# Patient Record
Sex: FEMALE | Race: WHITE | Employment: FULL TIME | URBAN - METROPOLITAN AREA
[De-identification: names, ages, dates, MRNs, and addresses within clinical notes are randomized per-mention and may not be internally consistent; named-entity substitution may affect disease eponyms.]

---

## 2019-05-24 ENCOUNTER — HOSPITAL ENCOUNTER (EMERGENCY)
Age: 29
Discharge: HOME | End: 2019-05-24
Attending: EMERGENCY MEDICINE
Payer: COMMERCIAL

## 2019-05-24 ENCOUNTER — APPOINTMENT (OUTPATIENT)
Dept: GENERAL RADIOLOGY | Age: 29
End: 2019-05-24
Payer: COMMERCIAL

## 2019-05-24 VITALS
SYSTOLIC BLOOD PRESSURE: 118 MMHG | HEIGHT: 67 IN | TEMPERATURE: 97.6 F | BODY MASS INDEX: 43.95 KG/M2 | RESPIRATION RATE: 16 BRPM | HEART RATE: 101 BPM | DIASTOLIC BLOOD PRESSURE: 70 MMHG | OXYGEN SATURATION: 100 % | WEIGHT: 280 LBS

## 2019-05-24 DIAGNOSIS — S82.891A CLOSED FRACTURE OF RIGHT ANKLE, INITIAL ENCOUNTER: Primary | ICD-10-CM

## 2019-05-24 DIAGNOSIS — S82.831A CLOSED FRACTURE OF DISTAL END OF RIGHT FIBULA, UNSPECIFIED FRACTURE MORPHOLOGY, INITIAL ENCOUNTER: ICD-10-CM

## 2019-05-24 DIAGNOSIS — S99.911A INJURY OF RIGHT ANKLE, INITIAL ENCOUNTER: ICD-10-CM

## 2019-05-24 DIAGNOSIS — S82.51XA CLOSED DISPLACED FRACTURE OF MEDIAL MALLEOLUS OF RIGHT TIBIA, INITIAL ENCOUNTER: ICD-10-CM

## 2019-05-24 PROCEDURE — 96374 THER/PROPH/DIAG INJ IV PUSH: CPT

## 2019-05-24 PROCEDURE — 6360000002 HC RX W HCPCS: Performed by: EMERGENCY MEDICINE

## 2019-05-24 PROCEDURE — 99284 EMERGENCY DEPT VISIT MOD MDM: CPT

## 2019-05-24 PROCEDURE — 2500000003 HC RX 250 WO HCPCS: Performed by: EMERGENCY MEDICINE

## 2019-05-24 PROCEDURE — 73630 X-RAY EXAM OF FOOT: CPT

## 2019-05-24 PROCEDURE — 2580000003 HC RX 258: Performed by: EMERGENCY MEDICINE

## 2019-05-24 PROCEDURE — 73610 X-RAY EXAM OF ANKLE: CPT

## 2019-05-24 RX ORDER — OXYCODONE HYDROCHLORIDE AND ACETAMINOPHEN 5; 325 MG/1; MG/1
1 TABLET ORAL EVERY 4 HOURS PRN
Qty: 20 TABLET | Refills: 0 | Status: SHIPPED | OUTPATIENT
Start: 2019-05-24 | End: 2019-05-29

## 2019-05-24 RX ORDER — ETOMIDATE 2 MG/ML
12 INJECTION INTRAVENOUS ONCE
Status: COMPLETED | OUTPATIENT
Start: 2019-05-24 | End: 2019-05-24

## 2019-05-24 RX ORDER — IBUPROFEN 800 MG/1
800 TABLET ORAL EVERY 8 HOURS PRN
Qty: 120 TABLET | Refills: 0 | Status: ON HOLD | OUTPATIENT
Start: 2019-05-24 | End: 2019-06-04 | Stop reason: HOSPADM

## 2019-05-24 RX ORDER — FENTANYL CITRATE 50 UG/ML
50 INJECTION, SOLUTION INTRAMUSCULAR; INTRAVENOUS ONCE
Status: COMPLETED | OUTPATIENT
Start: 2019-05-24 | End: 2019-05-24

## 2019-05-24 RX ORDER — 0.9 % SODIUM CHLORIDE 0.9 %
1000 INTRAVENOUS SOLUTION INTRAVENOUS ONCE
Status: COMPLETED | OUTPATIENT
Start: 2019-05-24 | End: 2019-05-24

## 2019-05-24 RX ADMIN — SODIUM CHLORIDE 1000 ML: 9 INJECTION, SOLUTION INTRAVENOUS at 14:05

## 2019-05-24 RX ADMIN — FENTANYL CITRATE 50 MCG: 50 INJECTION, SOLUTION INTRAMUSCULAR; INTRAVENOUS at 14:15

## 2019-05-24 RX ADMIN — ETOMIDATE 12 MG: 2 INJECTION INTRAVENOUS at 14:35

## 2019-05-24 ASSESSMENT — PAIN DESCRIPTION - LOCATION: LOCATION: ANKLE

## 2019-05-24 ASSESSMENT — PAIN DESCRIPTION - ORIENTATION: ORIENTATION: RIGHT

## 2019-05-24 ASSESSMENT — PAIN SCALES - GENERAL
PAINLEVEL_OUTOF10: 2
PAINLEVEL_OUTOF10: 4

## 2019-05-24 NOTE — ED NOTES
No acute distress noted on discharge.  Alert and oriented times [address], RN  05/24/19 7814

## 2019-05-24 NOTE — ED NOTES
PATIENT IS AWAKE, ALERT AND ORIENTED TIMES THREE, PLANNED DISCHARGE PER FACILITY PROTOCOL     Jeovanny Carson RN  05/24/19 8952

## 2019-05-24 NOTE — ED PROVIDER NOTES
Deformity: Yes.             ROM: Not examined due to significant swelling and deformity on exam              Skin:  No erythema, rash or open wounds noted. Compartments soft and compressible, No calf tenderness        Distal Function:              Motor deficit: none. Full ROM of all toes             Sensory deficit: none. Intact sensations distally              Pulse deficit: none. Strong pedal pulse             Capillary refill: normal. <3 seconds   No tenderness to right knee  Integument:  Normal turgor. Warm, dry, without visible rash, unless noted elsewhere. Neurological: Motor functions intact. Lab / Imaging Results   (All laboratory and radiology results have been personally reviewed by myself)  Labs:  No results found for this visit on 05/24/19. Imaging: All Radiology results interpreted by Radiologist unless otherwise noted. XR ANKLE RIGHT (MIN 3 VIEWS)   Final Result   1. No CT evidence of acute intracranial abnormality, as questioned. If   there is clinical concern for potential underlying acute   cerebrovascular infarction/accident or other potential abnormalities   of underlying brain parenchyma, MRI of the brain would be recommended   for more detailed evaluation. .         XR FOOT RIGHT (MIN 3 VIEWS)   Final Result   1. Fracture of the distal fibula. 2. Fracture of the medial malleolus/tibia. 3. Disruption of the Ankle mortise alignment. 4. 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 5. Spurring at the origin of plantar aponeurosis.          XR ANKLE RIGHT (MIN 3 VIEWS)    (Results Pending)     ED Course / Medical Decision Making     Medications   0.9 % sodium chloride bolus (1,000 mLs Intravenous New Bag 5/24/19 1405)   fentaNYL (SUBLIMAZE) injection 50 mcg (50 mcg Intravenous Given 5/24/19 1415)   etomidate (AMIDATE) injection 12 mg (12 mg Intravenous Given 5/24/19 1435)      Consult(s):  None    Procedure(s):  -------------------------------- Conscious Sedation Procedure Note -----------------------------  Patient Name: Delia Mena   Medical Record Number: [id number]  Date: 5/24/19   Time: 1:57 PM   Room/Bed: 02/02   Preformed by Dr. Manasa Phillips     Indication: ankle dislocation and fracture dislocation  Consent: I have discussed with the patient and/or the patient representative the indication, alternatives, and the possible risks and/or complications of the planned procedure and the anesthesia methods. The patient and/or patient representative appear to understand and agree to proceed. Physician Involvement: The attending physician was present and supervising this procedure. 5/24/19     Time: 1435 (pre-procedure start time)  Pre-Sedation Documentation and Exam: I have personally completed a history, physical exam & review of systems for this patient (see notes). Airway Assessment: normal  Prior History of Anesthesia Complications: none  ASA Classification: Class 1 - A normal healthy patient  Sedation/ Anesthesia Plan: intravenous sedation  Medications Used: etomidate intravenously  Monitoring and Safety: The patient was placed on a cardiac monitor and vital signs, pulse oximetry and level of consciousness were continuously evaluated throughout the procedure. The patient was closely monitored until recovery from the medications was complete and the patient had returned to baseline status.  Respiratory therapy was on standby at all times during the procedure.    ----------------------------------- Post Conscious Sedation Note -----------------------------------  (The following Post Sedation note must be completed)  5/24/19     Time: 1441 (as per nursing note stop time)  Post-Sedation Vital Signs: Vital signs were reviewed and were stable after the procedure (see flow sheet for vitals)       Post-Sedation Exam: Lungs: clear to auscultation bilaterally without crackles or wheezing and Cardiovascular: regular rate and rhythm       Complications: none       PROCEDURE NOTE    SPLINT home  Patient condition is good    New Medications     New Prescriptions    IBUPROFEN (IBU) 800 MG TABLET    Take 1 tablet by mouth every 8 hours as needed for Pain    OXYCODONE-ACETAMINOPHEN (PERCOCET) 5-325 MG PER TABLET    Take 1 tablet by mouth every 4 hours as needed for Pain for up to 5 days. Electronically signed by Katerina England PA-C   DD: 5/24/19  **This report was transcribed using voice recognition software. Every effort was made to ensure accuracy; however, inadvertent computerized transcription errors may be present.     END OF ED PROVIDER NOTE        Katerina England PA-C  05/24/19 6694

## 2019-05-24 NOTE — ED NOTES
Awake and conversing with family appropriately. Pain is a 3/10 and tolerable. Family remains at the bedside. Will monitor.       Tierra Penn RN  05/24/19 2011

## 2019-05-29 ENCOUNTER — OFFICE VISIT (OUTPATIENT)
Dept: ORTHOPEDIC SURGERY | Age: 29
End: 2019-05-29
Payer: COMMERCIAL

## 2019-05-29 VITALS
HEIGHT: 68 IN | WEIGHT: 285 LBS | HEART RATE: 114 BPM | SYSTOLIC BLOOD PRESSURE: 142 MMHG | BODY MASS INDEX: 43.19 KG/M2 | DIASTOLIC BLOOD PRESSURE: 107 MMHG

## 2019-05-29 DIAGNOSIS — S99.911A INJURY OF RIGHT ANKLE, INITIAL ENCOUNTER: Primary | ICD-10-CM

## 2019-05-29 PROCEDURE — 99203 OFFICE O/P NEW LOW 30 MIN: CPT | Performed by: ORTHOPAEDIC SURGERY

## 2019-05-29 NOTE — PROGRESS NOTES
New Ankle Patient     Referring Provider: No referring provider defined for this encounter. CHIEF COMPLAINT:   Chief Complaint   Patient presents with    ED Follow-up     5/24/19, WC right ankle, reduced in the ER     Ankle Injury     ( r ) ankle, states she was at work, in a trailer, the trailer had hay in it, states she stepped and slipped.  Ankle Pain     ( r ) ankle         HPI:    Dallas Carroll is a 33y.o. year old female who recently suffered a fall while at work and she twisted her right ankle awkwardly. She felt a sharp pain and was unable to bear weight. She went to the emergency room where x-ray showed a fracture dislocation of her ankle. This was reduced. She was placed in a splint. She follows up here today. She denies previous ankle pain or injuries    PAST MEDICAL HISTORY  No past medical history on file. PAST SURGICAL HISTORY  No past surgical history on file. FAMILY HISTORY   No family history on file.     SOCIAL HISTORY  Social History     Socioeconomic History    Marital status: Single     Spouse name: Not on file    Number of children: Not on file    Years of education: Not on file    Highest education level: Not on file   Occupational History    Not on file   Social Needs    Financial resource strain: Not on file    Food insecurity:     Worry: Not on file     Inability: Not on file    Transportation needs:     Medical: Not on file     Non-medical: Not on file   Tobacco Use    Smoking status: Former Smoker    Smokeless tobacco: Never Used   Substance and Sexual Activity    Alcohol use: Yes     Comment: socially    Drug use: Never    Sexual activity: Not on file   Lifestyle    Physical activity:     Days per week: Not on file     Minutes per session: Not on file    Stress: Not on file   Relationships    Social connections:     Talks on phone: Not on file     Gets together: Not on file     Attends Worship service: Not on file     Active member of club or organization: Not on file     Attends meetings of clubs or organizations: Not on file     Relationship status: Not on file    Intimate partner violence:     Fear of current or ex partner: Not on file     Emotionally abused: Not on file     Physically abused: Not on file     Forced sexual activity: Not on file   Other Topics Concern    Not on file   Social History Narrative    Not on file     Social History     Occupational History    Not on file   Tobacco Use    Smoking status: Former Smoker    Smokeless tobacco: Never Used   Substance and Sexual Activity    Alcohol use: Yes     Comment: socially    Drug use: Never    Sexual activity: Not on file       CURRENT MEDICATIONS     Current Outpatient Medications:     oxyCODONE-acetaminophen (PERCOCET) 5-325 MG per tablet, Take 1 tablet by mouth every 4 hours as needed for Pain for up to 5 days. , Disp: 20 tablet, Rfl: 0    ibuprofen (IBU) 800 MG tablet, Take 1 tablet by mouth every 8 hours as needed for Pain, Disp: 120 tablet, Rfl: 0    ALLERGIES  No Known Allergies    Controlled Substances Monitoring:        REVIEW OF SYSTEMS:     Constitutional:  Negative for weight loss, fevers, chills, fatigue  Cardiovascular: Negative for chest pain, palpitations  Pulmonary: Negative for shortness of breath, labored breathing, cough  GI: negative for abdominal pain, nausea, vomitting   MSK: per HPI  Skin: negative for rash, open wounds    All other systems reviewed and are negative       PHYSICAL EXAM     Vitals:    05/29/19 1131   BP: (!) 142/107   Site: Left Upper Arm   Position: Sitting   Cuff Size: Medium Adult   Pulse: 114   Weight: 285 lb (129.3 kg)   Height: 5' 8\" (1.727 m)       Height: 5' 8\" (1.727 m)  Weight: [unfilled]  BMI:  Body mass index is 43.33 kg/m². General: The patient is alert and oriented x 3, appears to be stated age and in no distress. HEENT: head is normocephalic, atraumatic. EOMI.    Neck: supple, trachea midline, no thyromegaly Cardiovascular: peripheral pulses palpable. Normal Capillary refill   Respiratory: breathing unlabored, chest expansion symmetric   Skin: no rash, no open wounds, no erythema  Psych: normal affect; mood stable  Neurologic: gait normal, sensation grossly intact in extremities  MSK:    Ankle: On exam of the ankle out of her splint, she does have some bruising and swelling both medial and lateral.  She has tenderness over the ankle. She is able to wiggle her toes. She has sensation throughout the foot. She has brisk cap refill in the toes     IMAGING:    XR: X-ray of the right ankle reviewed from the emergency room show fracture dislocation bimalleolar, with adequate reduction. Imaging discussed with patient    ASSESSMENT  Right ankle bimalleolar ankle fracture    PLAN  We discussed her ankle today. Given the nature of her injury it was recommended she undergo operative fixation. She was placed into a well-padded splint today to aid with reduction of swelling. We will also obtain a CT scan. We will plan on surgery likely next Tuesday pending skin condition. Risks were discussed in detail. She is electing to proceed.   She will remain nonweightbearing in the interim        Jhonathan Slade MD  Orthopaedic Surgery   5/29/19  4:24 PM

## 2019-05-31 ENCOUNTER — TELEPHONE (OUTPATIENT)
Dept: ORTHOPEDIC SURGERY | Age: 29
End: 2019-05-31

## 2019-05-31 NOTE — TELEPHONE ENCOUNTER
A C9 was sent to [facility], which is her private . Spoke to her  Simone Skelton who said he would get the surgery approved and send something to the office stating so.

## 2019-05-31 NOTE — PROGRESS NOTES
[facility] PRE-ADMISSION TESTING INSTRUCTIONS    The Preadmission Testing patient is instructed accordingly using the following criteria (check applicable):    ARRIVAL INSTRUCTIONS:  [x] Parking the day of Surgery is located in the Main Entrance lot. Upon entering the door, make an immediate right to the surgery reception desk    [x] [facility] is available Monday through Friday 6 am to 6 pm    [x] Bring photo ID and insurance card    [] Bring in a copy of Living will or Durable Power of  papers. [x] Please be sure to arrange for responsible adult to provide transportation to and from the hospital    [x] Please arrange for responsible adult to be with you for the 24 hour period post procedure due to having anesthesia      GENERAL INSTRUCTIONS:    [x] Nothing by mouth after midnight, including gum, candy, mints or water    [x] You may brush your teeth, but do not swallow any water    [] Take medications as instructed with 1-2 oz of water    [] Stop herbal supplements and vitamins 5 days prior to procedure    [] Follow preop dosing of blood thinners per physician instructions    [] Take 1/2 dose of evening insulin, but no insulin after midnight    [] No oral diabetic medications after midnight    [] If diabetic and have low blood sugar or feel symptomatic, take 1-2oz apple juice only    [] Bring inhalers day of surgery    [] Bring C-PAP/ Bi-Pap day of surgery    [x] Bring urine specimen day of surgery    [x] Shower or bath with soap, lather and rinse well, AM of Surgery, no lotion, powders or creams to surgical site    [] Follow bowel prep as instructed per surgeon    [x] No tobacco products within 24 hours of surgery     [x] No alcohol or illegal drug use within 24 hours of surgery.     [x] Jewelry, body piercing's, eyeglasses, contact lenses and dentures are not permitted into surgery (bring cases)      [x] Please do not wear any nail polish, make up or hair products on the day of surgery    [x] If not already done, you can expect a call from registration    [x] You can expect a call the business day prior to procedure to notify you if your arrival time changes    [x] If you receive a survey after surgery we would greatly appreciate your comments    [] Parent/guardian of a minor must accompany their child and remain on the premises  the entire time they are under our care     [] Pediatric patients may bring favorite toy, blanket or comfort item with them    [] A caregiver or family member must remain with the patient during their stay if they are mentally handicapped, have dementia, disoriented or unable to use a call light or would be a safety concern if left unattended    [x] Please notify surgeon if you develop any illness between now and time of surgery (cold, cough, sore throat, fever, nausea, vomiting) or any signs of infections  including skin, wounds, and dental.    [x]  The Outpatient Pharmacy is available to fill your prescription here on your day of surgery, ask your preop nurse for details    [] Other instructions  EDUCATIONAL MATERIALS PROVIDED:    [] PAT Preoperative Education Packet/Booklet     [] Medication List    [] Fluoroscopy Information Pamphlet    [] Transfusion bracelet applied with instructions    [] Joint replacement video reviewed    [] Shower with soap, lather and rinse well, and use CHG wipes provided the evening before surgery as instructed

## 2019-05-31 NOTE — TELEPHONE ENCOUNTER
Spoke to [place] at surgery scheduling 5/30 to get patient on the schedule for 6/4 for right ankle ORIF. Pt aware of all dates/times pre/post op.

## 2019-06-04 ENCOUNTER — HOSPITAL ENCOUNTER (OUTPATIENT)
Age: 29
Setting detail: OUTPATIENT SURGERY
Discharge: HOME | End: 2019-06-04
Attending: ORTHOPAEDIC SURGERY | Admitting: ORTHOPAEDIC SURGERY
Payer: COMMERCIAL

## 2019-06-04 ENCOUNTER — ANESTHESIA (OUTPATIENT)
Dept: OPERATING ROOM | Age: 29
End: 2019-06-04
Payer: COMMERCIAL

## 2019-06-04 ENCOUNTER — HOSPITAL ENCOUNTER (OUTPATIENT)
Dept: GENERAL RADIOLOGY | Age: 29
Discharge: HOME | End: 2019-06-06
Attending: ORTHOPAEDIC SURGERY
Payer: COMMERCIAL

## 2019-06-04 ENCOUNTER — ANESTHESIA EVENT (OUTPATIENT)
Dept: OPERATING ROOM | Age: 29
End: 2019-06-04
Payer: COMMERCIAL

## 2019-06-04 VITALS
OXYGEN SATURATION: 99 % | BODY MASS INDEX: 43.95 KG/M2 | HEART RATE: 69 BPM | TEMPERATURE: 97.1 F | WEIGHT: 280 LBS | RESPIRATION RATE: 20 BRPM | SYSTOLIC BLOOD PRESSURE: 134 MMHG | HEIGHT: 67 IN | DIASTOLIC BLOOD PRESSURE: 81 MMHG

## 2019-06-04 VITALS — OXYGEN SATURATION: 92 % | DIASTOLIC BLOOD PRESSURE: 62 MMHG | TEMPERATURE: 96.1 F | SYSTOLIC BLOOD PRESSURE: 118 MMHG

## 2019-06-04 DIAGNOSIS — S82.841A CLOSED BIMALLEOLAR FRACTURE OF RIGHT ANKLE, INITIAL ENCOUNTER: Primary | ICD-10-CM

## 2019-06-04 DIAGNOSIS — R52 PAIN: ICD-10-CM

## 2019-06-04 LAB — HCG(URINE) PREGNANCY TEST: NEGATIVE

## 2019-06-04 PROCEDURE — C1713 ANCHOR/SCREW BN/BN,TIS/BN: HCPCS | Performed by: ORTHOPAEDIC SURGERY

## 2019-06-04 PROCEDURE — 27814 TREATMENT OF ANKLE FRACTURE: CPT | Performed by: ORTHOPAEDIC SURGERY

## 2019-06-04 PROCEDURE — 3700000000 HC ANESTHESIA ATTENDED CARE: Performed by: ORTHOPAEDIC SURGERY

## 2019-06-04 PROCEDURE — 6360000002 HC RX W HCPCS: Performed by: ANESTHESIOLOGY

## 2019-06-04 PROCEDURE — 7100000001 HC PACU RECOVERY - ADDTL 15 MIN: Performed by: ORTHOPAEDIC SURGERY

## 2019-06-04 PROCEDURE — 2709999900 HC NON-CHARGEABLE SUPPLY: Performed by: ORTHOPAEDIC SURGERY

## 2019-06-04 PROCEDURE — 7100000011 HC PHASE II RECOVERY - ADDTL 15 MIN: Performed by: ORTHOPAEDIC SURGERY

## 2019-06-04 PROCEDURE — 3600000014 HC SURGERY LEVEL 4 ADDTL 15MIN: Performed by: ORTHOPAEDIC SURGERY

## 2019-06-04 PROCEDURE — 6360000002 HC RX W HCPCS

## 2019-06-04 PROCEDURE — 6360000002 HC RX W HCPCS: Performed by: ORTHOPAEDIC SURGERY

## 2019-06-04 PROCEDURE — 2580000003 HC RX 258: Performed by: ORTHOPAEDIC SURGERY

## 2019-06-04 PROCEDURE — 3700000001 HC ADD 15 MINUTES (ANESTHESIA): Performed by: ORTHOPAEDIC SURGERY

## 2019-06-04 PROCEDURE — 2500000003 HC RX 250 WO HCPCS: Performed by: ANESTHESIOLOGY

## 2019-06-04 PROCEDURE — 2500000003 HC RX 250 WO HCPCS

## 2019-06-04 PROCEDURE — 81025 URINE PREGNANCY TEST: CPT

## 2019-06-04 PROCEDURE — 7100000000 HC PACU RECOVERY - FIRST 15 MIN: Performed by: ORTHOPAEDIC SURGERY

## 2019-06-04 PROCEDURE — 3600000004 HC SURGERY LEVEL 4 BASE: Performed by: ORTHOPAEDIC SURGERY

## 2019-06-04 PROCEDURE — 64445 NJX AA&/STRD SCIATIC NRV IMG: CPT | Performed by: ANESTHESIOLOGY

## 2019-06-04 PROCEDURE — 7100000010 HC PHASE II RECOVERY - FIRST 15 MIN: Performed by: ORTHOPAEDIC SURGERY

## 2019-06-04 PROCEDURE — 6370000000 HC RX 637 (ALT 250 FOR IP): Performed by: ANESTHESIOLOGY

## 2019-06-04 PROCEDURE — 3209999900 FLUORO FOR SURGICAL PROCEDURES

## 2019-06-04 DEVICE — SCREW BNE L18MM DIA2.7MM ANK S STL LOK LO PROF FOR FRAC: Type: IMPLANTABLE DEVICE | Site: ANKLE | Status: FUNCTIONAL

## 2019-06-04 DEVICE — SCREW BNE L20MM DIA2.7MM FT ANK S STL LOK LO PROF: Type: IMPLANTABLE DEVICE | Site: ANKLE | Status: FUNCTIONAL

## 2019-06-04 DEVICE — SCREW BNE L14MM DIA3.5MM CORT ANK S STL NONLOCKING LO PROF: Type: IMPLANTABLE DEVICE | Site: ANKLE | Status: FUNCTIONAL

## 2019-06-04 DEVICE — SCREW BNE L32MM DIA3.5MM CORT ANK S STL NONLOCKING LO PROF: Type: IMPLANTABLE DEVICE | Site: ANKLE | Status: FUNCTIONAL

## 2019-06-04 DEVICE — SCREW BNE L26MM DIA3.5MM CORT ANK S STL NONLOCKING LO PROF: Type: IMPLANTABLE DEVICE | Site: ANKLE | Status: FUNCTIONAL

## 2019-06-04 DEVICE — PLATE BNE 3 H MED S STL LOK FOR ANK FRAC MGMT: Type: IMPLANTABLE DEVICE | Site: ANKLE | Status: FUNCTIONAL

## 2019-06-04 DEVICE — IMPLANTABLE DEVICE: Type: IMPLANTABLE DEVICE | Site: ANKLE | Status: FUNCTIONAL

## 2019-06-04 DEVICE — SCREW BNE L16MM DIA3.5MM CORT ANK S STL NONLOCKING LO PROF: Type: IMPLANTABLE DEVICE | Site: ANKLE | Status: FUNCTIONAL

## 2019-06-04 DEVICE — SCREW BNE L40MM DIA4MM CANC FT ANK S STL ST CANN NONLOCKING: Type: IMPLANTABLE DEVICE | Site: ANKLE | Status: FUNCTIONAL

## 2019-06-04 DEVICE — SCREW BNE L16MM DIA2.7MM ANK S STL LOK LO PROF FOR FRAC: Type: IMPLANTABLE DEVICE | Site: ANKLE | Status: FUNCTIONAL

## 2019-06-04 RX ORDER — SODIUM CHLORIDE 0.9 % (FLUSH) 0.9 %
10 SYRINGE (ML) INJECTION PRN
Status: DISCONTINUED | OUTPATIENT
Start: 2019-06-04 | End: 2019-06-04 | Stop reason: HOSPADM

## 2019-06-04 RX ORDER — MIDAZOLAM HYDROCHLORIDE 1 MG/ML
INJECTION INTRAMUSCULAR; INTRAVENOUS PRN
Status: DISCONTINUED | OUTPATIENT
Start: 2019-06-04 | End: 2019-06-04 | Stop reason: SDUPTHER

## 2019-06-04 RX ORDER — DEXAMETHASONE SODIUM PHOSPHATE 10 MG/ML
10 INJECTION, SOLUTION INTRAMUSCULAR; INTRAVENOUS ONCE
Status: COMPLETED | OUTPATIENT
Start: 2019-06-04 | End: 2019-06-04

## 2019-06-04 RX ORDER — LIDOCAINE HYDROCHLORIDE 10 MG/ML
10 INJECTION, SOLUTION INFILTRATION; PERINEURAL
Status: COMPLETED | OUTPATIENT
Start: 2019-06-04 | End: 2019-06-04

## 2019-06-04 RX ORDER — GLYCOPYRROLATE 1 MG/5 ML
SYRINGE (ML) INTRAVENOUS PRN
Status: DISCONTINUED | OUTPATIENT
Start: 2019-06-04 | End: 2019-06-04 | Stop reason: SDUPTHER

## 2019-06-04 RX ORDER — KETOROLAC TROMETHAMINE 10 MG/1
10 TABLET, FILM COATED ORAL EVERY 6 HOURS PRN
Qty: 8 TABLET | Refills: 0 | Status: SHIPPED | OUTPATIENT
Start: 2019-06-04 | End: 2019-06-12

## 2019-06-04 RX ORDER — PROMETHAZINE HYDROCHLORIDE 25 MG/ML
6.25 INJECTION, SOLUTION INTRAMUSCULAR; INTRAVENOUS
Status: CANCELLED | OUTPATIENT
Start: 2019-06-04 | End: 2019-06-04

## 2019-06-04 RX ORDER — PROPOFOL 10 MG/ML
INJECTION, EMULSION INTRAVENOUS PRN
Status: DISCONTINUED | OUTPATIENT
Start: 2019-06-04 | End: 2019-06-04 | Stop reason: SDUPTHER

## 2019-06-04 RX ORDER — ROPIVACAINE HYDROCHLORIDE 5 MG/ML
INJECTION, SOLUTION EPIDURAL; INFILTRATION; PERINEURAL
Status: COMPLETED | OUTPATIENT
Start: 2019-06-04 | End: 2019-06-04

## 2019-06-04 RX ORDER — DEXAMETHASONE SODIUM PHOSPHATE 4 MG/ML
INJECTION, SOLUTION INTRA-ARTICULAR; INTRALESIONAL; INTRAMUSCULAR; INTRAVENOUS; SOFT TISSUE PRN
Status: DISCONTINUED | OUTPATIENT
Start: 2019-06-04 | End: 2019-06-04 | Stop reason: SDUPTHER

## 2019-06-04 RX ORDER — FENTANYL CITRATE 50 UG/ML
50 INJECTION, SOLUTION INTRAMUSCULAR; INTRAVENOUS EVERY 5 MIN PRN
Status: CANCELLED | OUTPATIENT
Start: 2019-06-04

## 2019-06-04 RX ORDER — MEPERIDINE HYDROCHLORIDE 25 MG/ML
12.5 INJECTION INTRAMUSCULAR; INTRAVENOUS; SUBCUTANEOUS EVERY 10 MIN PRN
Status: CANCELLED | OUTPATIENT
Start: 2019-06-04

## 2019-06-04 RX ORDER — MIDAZOLAM HYDROCHLORIDE 1 MG/ML
0.5 INJECTION INTRAMUSCULAR; INTRAVENOUS PRN
Status: DISCONTINUED | OUTPATIENT
Start: 2019-06-04 | End: 2019-06-04 | Stop reason: HOSPADM

## 2019-06-04 RX ORDER — NEOSTIGMINE METHYLSULFATE 1 MG/ML
INJECTION, SOLUTION INTRAVENOUS PRN
Status: DISCONTINUED | OUTPATIENT
Start: 2019-06-04 | End: 2019-06-04 | Stop reason: SDUPTHER

## 2019-06-04 RX ORDER — SODIUM CHLORIDE 0.9 % (FLUSH) 0.9 %
10 SYRINGE (ML) INJECTION EVERY 12 HOURS SCHEDULED
Status: DISCONTINUED | OUTPATIENT
Start: 2019-06-04 | End: 2019-06-04 | Stop reason: HOSPADM

## 2019-06-04 RX ORDER — FENTANYL CITRATE 50 UG/ML
25 INJECTION, SOLUTION INTRAMUSCULAR; INTRAVENOUS PRN
Status: DISCONTINUED | OUTPATIENT
Start: 2019-06-04 | End: 2019-06-04 | Stop reason: HOSPADM

## 2019-06-04 RX ORDER — HYDROCODONE BITARTRATE AND ACETAMINOPHEN 5; 325 MG/1; MG/1
1 TABLET ORAL
Status: COMPLETED | OUTPATIENT
Start: 2019-06-04 | End: 2019-06-04

## 2019-06-04 RX ORDER — ONDANSETRON 2 MG/ML
INJECTION INTRAMUSCULAR; INTRAVENOUS PRN
Status: DISCONTINUED | OUTPATIENT
Start: 2019-06-04 | End: 2019-06-04 | Stop reason: SDUPTHER

## 2019-06-04 RX ORDER — HYDROCODONE BITARTRATE AND ACETAMINOPHEN 5; 325 MG/1; MG/1
1 TABLET ORAL EVERY 6 HOURS PRN
Qty: 20 TABLET | Refills: 0 | Status: SHIPPED | OUTPATIENT
Start: 2019-06-04 | End: 2019-06-09

## 2019-06-04 RX ORDER — OXYCODONE HYDROCHLORIDE AND ACETAMINOPHEN 5; 325 MG/1; MG/1
1 TABLET ORAL
Status: CANCELLED | OUTPATIENT
Start: 2019-06-04 | End: 2019-06-04

## 2019-06-04 RX ORDER — ROCURONIUM BROMIDE 10 MG/ML
INJECTION, SOLUTION INTRAVENOUS PRN
Status: DISCONTINUED | OUTPATIENT
Start: 2019-06-04 | End: 2019-06-04 | Stop reason: SDUPTHER

## 2019-06-04 RX ORDER — SODIUM CHLORIDE 9 MG/ML
INJECTION, SOLUTION INTRAVENOUS CONTINUOUS
Status: DISCONTINUED | OUTPATIENT
Start: 2019-06-04 | End: 2019-06-04 | Stop reason: HOSPADM

## 2019-06-04 RX ORDER — FENTANYL CITRATE 50 UG/ML
INJECTION, SOLUTION INTRAMUSCULAR; INTRAVENOUS PRN
Status: DISCONTINUED | OUTPATIENT
Start: 2019-06-04 | End: 2019-06-04 | Stop reason: SDUPTHER

## 2019-06-04 RX ORDER — LIDOCAINE HYDROCHLORIDE 20 MG/ML
INJECTION, SOLUTION EPIDURAL; INFILTRATION; INTRACAUDAL; PERINEURAL PRN
Status: DISCONTINUED | OUTPATIENT
Start: 2019-06-04 | End: 2019-06-04 | Stop reason: SDUPTHER

## 2019-06-04 RX ORDER — ROPIVACAINE HYDROCHLORIDE 5 MG/ML
30 INJECTION, SOLUTION EPIDURAL; INFILTRATION; PERINEURAL
Status: COMPLETED | OUTPATIENT
Start: 2019-06-04 | End: 2019-06-04

## 2019-06-04 RX ADMIN — SODIUM CHLORIDE: 9 INJECTION, SOLUTION INTRAVENOUS at 14:40

## 2019-06-04 RX ADMIN — DEXAMETHASONE SODIUM PHOSPHATE 10 MG: 10 INJECTION INTRAMUSCULAR; INTRAVENOUS at 11:52

## 2019-06-04 RX ADMIN — LIDOCAINE HYDROCHLORIDE 80 MG: 20 INJECTION, SOLUTION EPIDURAL; INFILTRATION; INTRACAUDAL; PERINEURAL at 12:51

## 2019-06-04 RX ADMIN — PROPOFOL 30 MG: 10 INJECTION, EMULSION INTRAVENOUS at 14:53

## 2019-06-04 RX ADMIN — ROPIVACAINE HYDROCHLORIDE 30 ML: 5 INJECTION, SOLUTION EPIDURAL; INFILTRATION; PERINEURAL at 11:52

## 2019-06-04 RX ADMIN — ROCURONIUM BROMIDE 20 MG: 10 SOLUTION INTRAVENOUS at 14:07

## 2019-06-04 RX ADMIN — LIDOCAINE HYDROCHLORIDE 10 ML: 10 INJECTION, SOLUTION INFILTRATION; PERINEURAL at 11:51

## 2019-06-04 RX ADMIN — ROCURONIUM BROMIDE 40 MG: 10 SOLUTION INTRAVENOUS at 12:57

## 2019-06-04 RX ADMIN — CEFAZOLIN 3 G: 1 INJECTION, POWDER, FOR SOLUTION INTRAMUSCULAR; INTRAVENOUS at 12:51

## 2019-06-04 RX ADMIN — ROCURONIUM BROMIDE 10 MG: 10 SOLUTION INTRAVENOUS at 13:33

## 2019-06-04 RX ADMIN — FENTANYL CITRATE 50 MCG: 50 INJECTION INTRAMUSCULAR; INTRAVENOUS at 11:48

## 2019-06-04 RX ADMIN — SODIUM CHLORIDE: 9 INJECTION, SOLUTION INTRAVENOUS at 12:51

## 2019-06-04 RX ADMIN — Medication 0.6 MG: at 14:50

## 2019-06-04 RX ADMIN — HYDROCODONE BITARTRATE AND ACETAMINOPHEN 1 TABLET: 5; 325 TABLET ORAL at 16:25

## 2019-06-04 RX ADMIN — Medication 3 MG: at 14:50

## 2019-06-04 RX ADMIN — FENTANYL CITRATE 50 MCG: 50 INJECTION, SOLUTION INTRAMUSCULAR; INTRAVENOUS at 12:51

## 2019-06-04 RX ADMIN — MIDAZOLAM HYDROCHLORIDE 1 MG: 1 INJECTION, SOLUTION INTRAMUSCULAR; INTRAVENOUS at 11:48

## 2019-06-04 RX ADMIN — PROPOFOL 150 MG: 10 INJECTION, EMULSION INTRAVENOUS at 12:51

## 2019-06-04 RX ADMIN — DEXAMETHASONE SODIUM PHOSPHATE 10 MG: 4 INJECTION, SOLUTION INTRAMUSCULAR; INTRAVENOUS at 12:57

## 2019-06-04 RX ADMIN — ROPIVACAINE HYDROCHLORIDE 30 ML: 5 INJECTION, SOLUTION EPIDURAL; INFILTRATION; PERINEURAL at 11:59

## 2019-06-04 RX ADMIN — FENTANYL CITRATE 50 MCG: 50 INJECTION, SOLUTION INTRAMUSCULAR; INTRAVENOUS at 13:15

## 2019-06-04 RX ADMIN — ONDANSETRON HYDROCHLORIDE 4 MG: 2 INJECTION, SOLUTION INTRAMUSCULAR; INTRAVENOUS at 12:57

## 2019-06-04 RX ADMIN — MIDAZOLAM HYDROCHLORIDE 2 MG: 1 INJECTION, SOLUTION INTRAMUSCULAR; INTRAVENOUS at 12:51

## 2019-06-04 ASSESSMENT — PAIN DESCRIPTION - LOCATION
LOCATION: FOOT
LOCATION: ANKLE
LOCATION: FOOT
LOCATION: ANKLE

## 2019-06-04 ASSESSMENT — PULMONARY FUNCTION TESTS
PIF_VALUE: 1
PIF_VALUE: 24
PIF_VALUE: 23
PIF_VALUE: 23
PIF_VALUE: 24
PIF_VALUE: 24
PIF_VALUE: 23
PIF_VALUE: 24
PIF_VALUE: 24
PIF_VALUE: 2
PIF_VALUE: 0
PIF_VALUE: 24
PIF_VALUE: 1
PIF_VALUE: 24
PIF_VALUE: 5
PIF_VALUE: 2
PIF_VALUE: 2
PIF_VALUE: 24
PIF_VALUE: 21
PIF_VALUE: 24
PIF_VALUE: 24
PIF_VALUE: 23
PIF_VALUE: 24
PIF_VALUE: 0
PIF_VALUE: 2
PIF_VALUE: 23
PIF_VALUE: 2
PIF_VALUE: 23
PIF_VALUE: 2
PIF_VALUE: 24
PIF_VALUE: 24
PIF_VALUE: 2
PIF_VALUE: 23
PIF_VALUE: 24
PIF_VALUE: 2
PIF_VALUE: 24
PIF_VALUE: 23
PIF_VALUE: 24
PIF_VALUE: 23
PIF_VALUE: 23
PIF_VALUE: 2
PIF_VALUE: 24
PIF_VALUE: 23
PIF_VALUE: 24
PIF_VALUE: 24
PIF_VALUE: 21
PIF_VALUE: 24
PIF_VALUE: 24
PIF_VALUE: 2
PIF_VALUE: 23
PIF_VALUE: 2
PIF_VALUE: 24
PIF_VALUE: 24
PIF_VALUE: 2
PIF_VALUE: 2
PIF_VALUE: 23
PIF_VALUE: 24
PIF_VALUE: 24
PIF_VALUE: 2
PIF_VALUE: 3
PIF_VALUE: 23
PIF_VALUE: 24
PIF_VALUE: 24
PIF_VALUE: 23
PIF_VALUE: 24
PIF_VALUE: 2
PIF_VALUE: 23
PIF_VALUE: 24
PIF_VALUE: 23
PIF_VALUE: 24
PIF_VALUE: 23
PIF_VALUE: 23
PIF_VALUE: 24
PIF_VALUE: 24
PIF_VALUE: 23
PIF_VALUE: 24
PIF_VALUE: 23
PIF_VALUE: 24
PIF_VALUE: 23
PIF_VALUE: 24
PIF_VALUE: 3
PIF_VALUE: 23
PIF_VALUE: 2
PIF_VALUE: 24
PIF_VALUE: 21
PIF_VALUE: 3
PIF_VALUE: 2
PIF_VALUE: 0
PIF_VALUE: 24
PIF_VALUE: 23
PIF_VALUE: 24
PIF_VALUE: 7
PIF_VALUE: 24
PIF_VALUE: 2
PIF_VALUE: 24
PIF_VALUE: 3
PIF_VALUE: 0
PIF_VALUE: 23
PIF_VALUE: 23
PIF_VALUE: 21
PIF_VALUE: 21
PIF_VALUE: 24
PIF_VALUE: 24
PIF_VALUE: 23
PIF_VALUE: 3
PIF_VALUE: 2
PIF_VALUE: 24
PIF_VALUE: 23
PIF_VALUE: 24
PIF_VALUE: 24
PIF_VALUE: 3
PIF_VALUE: 24
PIF_VALUE: 3
PIF_VALUE: 24
PIF_VALUE: 1
PIF_VALUE: 23

## 2019-06-04 ASSESSMENT — PAIN SCALES - GENERAL
PAINLEVEL_OUTOF10: 0
PAINLEVEL_OUTOF10: 2
PAINLEVEL_OUTOF10: 2
PAINLEVEL_OUTOF10: 0
PAINLEVEL_OUTOF10: 2
PAINLEVEL_OUTOF10: 3
PAINLEVEL_OUTOF10: 0
PAINLEVEL_OUTOF10: 5
PAINLEVEL_OUTOF10: 0

## 2019-06-04 ASSESSMENT — PAIN DESCRIPTION - ORIENTATION
ORIENTATION: RIGHT
ORIENTATION: RIGHT;INNER

## 2019-06-04 ASSESSMENT — PAIN DESCRIPTION - DESCRIPTORS
DESCRIPTORS: DISCOMFORT

## 2019-06-04 ASSESSMENT — PAIN DESCRIPTION - PAIN TYPE
TYPE: SURGICAL PAIN

## 2019-06-04 ASSESSMENT — PAIN DESCRIPTION - PROGRESSION: CLINICAL_PROGRESSION: GRADUALLY IMPROVING

## 2019-06-04 ASSESSMENT — PAIN DESCRIPTION - FREQUENCY: FREQUENCY: CONTINUOUS

## 2019-06-04 ASSESSMENT — PAIN - FUNCTIONAL ASSESSMENT: PAIN_FUNCTIONAL_ASSESSMENT: 0-10

## 2019-06-04 NOTE — PROGRESS NOTES
Received into stage 2 in no distress  1600 mother to room/assisted with nourishment  1700 criteria met  5 assisted with restroom using crutches and wheelchair

## 2019-06-04 NOTE — ANESTHESIA PRE PROCEDURE
ALKPHOS, AST, ALT    POC Tests: No results for input(s): POCGLU, POCNA, POCK, POCCL, POCBUN, POCHEMO, POCHCT in the last 72 hours. Coags: No results found for: PROTIME, INR, APTT    HCG (If Applicable): No results found for: PREGTESTUR, PREGSERUM, HCG, HCGQUANT     ABGs: No results found for: PHART, PO2ART, ARA9DHW, ZYN2QMU, BEART, B2CTEPVP     Type & Screen (If Applicable):  No results found for: LABABO, [address]    Anesthesia Evaluation  Patient summary reviewed and Nursing notes reviewed no history of anesthetic complications:   Airway: Mallampati: II  TM distance: >3 FB   Neck ROM: full  Mouth opening: > = 3 FB Dental:          Pulmonary:Negative Pulmonary ROS breath sounds clear to auscultation                             Cardiovascular:Negative CV ROS  Exercise tolerance: good (>4 METS),           Rhythm: regular  Rate: normal           Beta Blocker:  Not on Beta Blocker         Neuro/Psych:   Negative Neuro/Psych ROS              GI/Hepatic/Renal:   (+) morbid obesity          Endo/Other:                      ROS comment: R ankle fx Abdominal:           Vascular: negative vascular ROS. Anesthesia Plan      general and spinal     ASA 2             Anesthetic plan and risks discussed with patient and mother. Tiara Carter MD   6/4/2019        PNB Consent  Benefits, alternatives and risks of PNBs were discussed with patient. Risks include but are not limited to; bleeding, LAST, infection and possible nerve trauma. All questions answered. PNB was recommended and encouraged as part of multi-modal pain therapy. After consideration;    patient agrees to:   right        Popliteal Sciatic Block. for management of post-op pain.       Tiara Carter MD  Staff Anesthesiologist  June 4, 2019  10:39 AM

## 2019-06-04 NOTE — ANESTHESIA POSTPROCEDURE EVALUATION
Department of Anesthesiology  Postprocedure Note    Patient: Edu Valero  MRN: [id number]  YOB: 1990  Date of evaluation: 6/4/2019  Time:  7:46 PM     Procedure Summary     Date:  06/04/19 Room / Location:  [facility] OR 02 / [facility] OR    Anesthesia Start:  2323 Anesthesia Stop:  1518    Procedure:  RIGHT ANKLE OPEN REDUCTION INTERNAL FIXATION (Right ) Diagnosis:  (RIGHT ANKLE BIMALLEOLAR FRACTURE)    Surgeon:  Latonya Golden MD Responsible Provider:  Stephen Buckner MD    Anesthesia Type:  general, spinal ASA Status:  2          Anesthesia Type: general, spinal    Arleen Phase I: Arleen Score: 9    Arleen Phase II: Arleen Score: 10    Last vitals: Reviewed and per EMR flowsheets.        Anesthesia Post Evaluation    Patient location during evaluation: PACU  Patient participation: complete - patient participated  Level of consciousness: awake and alert  Airway patency: patent  Nausea & Vomiting: no vomiting and no nausea  Complications: no  Cardiovascular status: blood pressure returned to baseline  Respiratory status: acceptable  Hydration status: euvolemic

## 2019-06-04 NOTE — ANESTHESIA PROCEDURE NOTES
Peripheral Block    Patient location during procedure: pre-op  Start time: 6/4/2019 11:38 AM  End time: 6/4/2019 11:56 AM  Staffing  Anesthesiologist: José Miguel Dacosta MD  Performed: anesthesiologist   Preanesthetic Checklist  Completed: patient identified, site marked, surgical consent, pre-op evaluation, timeout performed, IV checked, risks and benefits discussed, monitors and equipment checked, anesthesia consent given, oxygen available and patient being monitored  Peripheral Block  Patient position: right lateral decubitus  Prep: ChloraPrep  Patient monitoring: cardiac monitor, continuous pulse ox, frequent blood pressure checks and IV access  Block type: Sciatic  Laterality: right  Injection technique: single-shot  Procedures: ultrasound guided and nerve stimulator  Local infiltration: lidocaine  Infiltration strength: 1 %  Dose: 5 mL  Popliteal  Provider prep: mask and sterile gloves  Local infiltration: lidocaine  Needle  Needle type: Tuohy   Needle gauge: 22 G  Needle length: 10 cm  Needle localization: ultrasound guidance and nerve stimulator  Catheter type: open end  Assessment  Injection assessment: no paresthesia on injection and local visualized surrounding nerve on ultrasound  Paresthesia pain: none  Slow fractionated injection: yes  Hemodynamics: stable  Additional Notes          Reason for block: post-op pain management and at surgeon's request

## 2019-06-04 NOTE — BRIEF OP NOTE
Brief Postoperative Note  ______________________________________________________________    Patient: Rebekah Evangelista  YOB: 1990  MRN: [id number]  Date of Procedure: 6/4/2019    Pre-Op Diagnosis: RIGHT ANKLE BIMALLEOLAR FRACTURE    Post-Op Diagnosis: Same       Procedure(s):  RIGHT ANKLE OPEN REDUCTION INTERNAL FIXATION    Anesthesia: General, Spinal    Surgeon(s):  Lori Iraheta MD    Assistant: Kortney Cristobal    Estimated Blood Loss (mL): less than 50     Complications: None    Specimens:   * No specimens in log *    Implants:  Implant Name Type Inv.  Item Serial No.  Lot No. LRB No. Used   SCREW LPROF LK GIA 3.5X26MM Screw/Plate/Nail/Jayson SCREW LPROF LK GIA 3.5X26MM  [employer]  Right 1   SCREW GIA LPROF NONLK STT 3.5X14MM Screw/Plate/Nail/Jayson SCREW GIA LPROF NONLK STT 3.5X14MM  [employer]  Right 1   SCREW GIA LOPRO TM 3.5X16MM Screw/Plate/Nail/Jasyon SCREW GIA LOPRO TM 3.5X16MM  [employer]  Right 2   SCREW NON LK 3.5X42MM Screw/Plate/Nail/Jayson SCREW NON LK 3.5X42MM  [employer]  Right 1   SCREW LK LPROF SS 2.7X16MM Screw/Plate/Nail/Jayson SCREW LK LPROF SS 2.7X16MM  [employer]  Right 1   SCREW LPROF LK SS 2.7X20MM Screw/Plate/Nail/Jayson SCREW LPROF LK SS 2.7X20MM  [employer]  Right 1   SCREW LK LPROF 2.7X18MM Screw/Plate/Nail/Jayson SCREW LK LPROF 2.7X18MM  [employer]  Right 1   SCREW LK LPROF SS 2.7X16MM Screw/Plate/Nail/Jayson SCREW LK LPROF SS 2.7X16MM  [employer]  Right 1   SCREW LPROF 4X40MM JEFFREY Screw/Plate/Nail/Jayson SCREW LPROF 4X40MM JEFFREY  [employer]  Right 1   SCREW 3.5X32MM CORTCL Screw/Plate/Nail/Jayson SCREW 3.5X32MM CORTCL  [employer]  Right 1   PLATE LK MEDIAL HOOK SS 3HL Screw/Plate/Nail/Jayson PLATE LK MEDIAL HOOK SS 3HL  [employer]  Right 1   PLATE LK 3RD TUBE SS 80MM 6HL Screw/Plate/Nail/Jayson PLATE LK 3RD TUBE SS 80MM 6HL  [employer]  Right 1       Ivana Raza,   Date: 6/4/2019  Time: 3:13 PM

## 2019-06-04 NOTE — H&P
New Ankle Patient      Referring Provider: No referring provider defined for this encounter.     CHIEF COMPLAINT:        Chief Complaint   Patient presents with    ED Follow-up       5/24/19, WC right ankle, reduced in the ER     Ankle Injury       ( r ) ankle, states she was at work, in a trailer, the trailer had hay in it, states she stepped and slipped.  Ankle Pain       ( r ) ankle          HPI:    Yaz Quinn is a 33y.o. year old female who recently suffered a fall while at work and she twisted her right ankle awkwardly. She felt a sharp pain and was unable to bear weight. She went to the emergency room where x-ray showed a fracture dislocation of her ankle. This was reduced. She was placed in a splint. She follows up here today. She denies previous ankle pain or injuries     PAST MEDICAL HISTORY  Past Medical History   No past medical history on file.        PAST SURGICAL HISTORY  Past Surgical History   No past surgical history on file.           FAMILY HISTORY   Family History   No family history on file.        SOCIAL HISTORY  Social History               Socioeconomic History    Marital status: Single       Spouse name: Not on file    Number of children: Not on file    Years of education: Not on file    Highest education level: Not on file   Occupational History    Not on file   Social Needs    Financial resource strain: Not on file    Food insecurity:       Worry: Not on file       Inability: Not on file    Transportation needs:       Medical: Not on file       Non-medical: Not on file   Tobacco Use    Smoking status: Former Smoker    Smokeless tobacco: Never Used   Substance and Sexual Activity    Alcohol use:  Yes       Comment: socially    Drug use: Never    Sexual activity: Not on file   Lifestyle    Physical activity:       Days per week: Not on file       Minutes per session: Not on file    Stress: Not on file   Relationships    Social connections:       Talks on phone: Not on file       Gets together: Not on file       Attends Mosque service: Not on file       Active member of club or organization: Not on file       Attends meetings of clubs or organizations: Not on file       Relationship status: Not on file    Intimate partner violence:       Fear of current or ex partner: Not on file       Emotionally abused: Not on file       Physically abused: Not on file       Forced sexual activity: Not on file   Other Topics Concern    Not on file   Social History Narrative    Not on file         Social History      Occupational History    Not on file   Tobacco Use    Smoking status: Former Smoker    Smokeless tobacco: Never Used   Substance and Sexual Activity    Alcohol use: Yes       Comment: socially    Drug use: Never    Sexual activity: Not on file         CURRENT MEDICATIONS     Current Medication      Current Outpatient Medications:     oxyCODONE-acetaminophen (PERCOCET) 5-325 MG per tablet, Take 1 tablet by mouth every 4 hours as needed for Pain for up to 5 days. , Disp: 20 tablet, Rfl: 0    ibuprofen (IBU) 800 MG tablet, Take 1 tablet by mouth every 8 hours as needed for Pain, Disp: 120 tablet, Rfl: 0        ALLERGIES  No Known Allergies     Controlled Substances Monitoring:          REVIEW OF SYSTEMS:      Constitutional:  Negative for weight loss, fevers, chills, fatigue  Cardiovascular: Negative for chest pain, palpitations  Pulmonary: Negative for shortness of breath, labored breathing, cough  GI: negative for abdominal pain, nausea, vomitting   MSK: per HPI  Skin: negative for rash, open wounds     All other systems reviewed and are negative         PHYSICAL EXAM      Vitals       Vitals:     05/29/19 1131   BP: (!) 142/107   Site: Left Upper Arm   Position: Sitting   Cuff Size: Medium Adult   Pulse: 114   Weight: 285 lb (129.3 kg)   Height: 5' 8\" (1.727 m)            Height: 5' 8\" (1.727 m)  Weight: [unfilled]  BMI:  Body mass index is 43.33 kg/m².     General: The patient is alert and oriented x 3, appears to be stated age and in no distress. HEENT: head is normocephalic, atraumatic. EOMI. Neck: supple, trachea midline, no thyromegaly   Cardiovascular: peripheral pulses palpable. Normal Capillary refill   Respiratory: breathing unlabored, chest expansion symmetric   Skin: no rash, no open wounds, no erythema  Psych: normal affect; mood stable  Neurologic: gait normal, sensation grossly intact in extremities  MSK:     Ankle: On exam of the ankle out of her splint, she does have some bruising and swelling both medial and lateral.  She has tenderness over the ankle. She is able to wiggle her toes. She has sensation throughout the foot. She has brisk cap refill in the toes     IMAGING:     XR: X-ray of the right ankle reviewed from the emergency room show fracture dislocation bimalleolar, with adequate reduction.     Imaging discussed with patient     ASSESSMENT  Right ankle bimalleolar ankle fracture     PLAN  We discussed her ankle today. Given the nature of her injury it was recommended she undergo operative fixation. She was placed into a well-padded splint today to aid with reduction of swelling. We will also obtain a CT scan. We will plan on surgery likely next Tuesday pending skin condition. Risks were discussed in detail. She is electing to proceed. She will remain nonweightbearing in the interim           Jessie Martínez MD  Orthopaedic Surgery   5/29/19  4:24 PM         Update History & Physical    The patient's History and Physical was reviewed with the patient and there were no significant changes. I examined the patient and there were no significant changes from the previous History and Physical.    Plan: The risk, benefits, expected outcome, and alternative to the recommended procedure have been discussed with the patient. Patient understands and wants to proceed with the procedure.     Electronically signed by Kleber Mclaughlin Shante Garcia MD  on 6/4/2019 at 12:41 PM

## 2019-06-05 NOTE — OP NOTE
[id number] [address]                                OPERATIVE REPORT    PATIENT NAME: Janeth Noland                   :        1990  MED REC NO:   [id number]                            ROOM:  ACCOUNT NO:   [de-identified]                           ADMIT DATE: 2019  PROVIDER:     Blake Stephens DO    DATE OF PROCEDURE:  2019    SURGEON:  Roberto Quijano MD.    ASSISTANT:  Blake Stephens DO, PGY-4.    PREOPERATIVE DIAGNOSIS:  Right bimalleolar ankle fracture. POSTOPERATIVE DIAGNOSIS:  Right bimalleolar ankle fracture. OPERATION PERFORMED:  Right ankle open reduction and internal fixation. ANESTHESIA:  General with regional block. ESTIMATED BLOOD LOSS:  Less than 50 mL. COMPLICATIONS:  None. SPECIMENS:  None. IMPLANTS:  Implant was Arthrex one-third tubular plate and Arthrex  medial hook plate. HISTORY:  The patient is a 71-year-old female who suffered a fall while  at work twisting her right ankle awkwardly. She felt a sharp pain and  was unable to bear weight. She then went to the emergency room, where  x-rays showed a fracture dislocation of her ankle. This was reduced and  a splint was placed. She then followed up with Dr. Guillermina Carroll who  reviewed her films with her as well as options. Risks, benefits, and  expected outcomes of all options were discussed. She elected to have  open reduction and internal fixation of the right ankle. OPERATIVE COURSE:  The patient was seen and identified outside of the  operative suite. At that time, the operative site was marked as  appropriate by the patient's surgeon, staff, and Anesthesia. The patient was then taken into the operative suite and transferred to the  operating table with all bony prominences and neurovascular structures  well padded and protected. A tourniquet was placed high on the right  thigh.   The patient was then sedated under the care of Anesthesia. Operative site was prepped and draped in the standard sterile fashion  and tourniquet was inflated to 300 mmHg. Incision was made along the distal fibula laterally and sharp dissection was carried out through the skin layer. Dissection scissors were then used proximally to identify  superficial peroneal nerve. Once this was identified, sharp dissection  was then again used distally down to bone and a periosteal elevator was  then used to expose the rest of the bone around the fracture site. The  fracture site was then debrided with curette and rongeur. The distal  fibular fracture was then reduced with a pointed reduction clamp and lobster  claw clamp. X-rays were taken to ensure the reduction was appropriate. Once the reduction was felt to be satisfactory, a lag screw was placed  across the fracture in A to P oblique angle. After lag screw placement,  x-rays were taken to ensure it was in good placement and the fracture  was well reduced. A plate was then sized on the fibula. A one-third  tubular lateral locking plate was chosen. The plate was pinned into  place. Fluoroscopy was used to ensure appropriate length of the plate  in position. Once this was felt to be satisfactory, cortical screw was  placed proximally. X-rays were then again checked to make sure the  plate had not moved and the fracture had maintained reduction. After  this, a second cortical screw was placed proximally. Distal locking  screws were then placed. Once all the distal locking screws were  placed, AP and lateral x-rays were again taken to ensure good placement  of all screws. One more proximal cortical screw was then placed and  x-rays were again taken to show good reduction and good plate placement  on the fibula. Attention was then turned to the medial malleolus, where  a longitudinal incision was made over the midportion of the medial  malleolus.   Vicky Silva lateral malleolus fixation. Once  this was felt to be satisfactory, the syndesmosis was stressed with  external rotation under live fluoroscopy. There did not appear  to be any movement at the medial clear space or the fracture sites. The  wounds were then copiously irrigated. The lateral wound was closed deep  with 0 Vicryl and then 2-0 subcutaneous sutures, then 3-0 nylon on the  skin. The medial wound was closed with 2-0 subcutaneous Vicryl sutures  and 3-0 nylon on the skin. Layered dressing was then placed over the  incisions and a three-sided short leg splint was then placed and molded  into appropriate position. Once the splint was hardened, the patient  was aroused by Anesthesia. She was then able to be moved to hospital  bed under help of OR staff to ensure safety. She was then able to be  taken to PACU in stable condition. It should be noted Dr. Zaina Torres was  present for the entirety of procedure. POSTOPERATIVE PLAN:  The patient will maintain the splint and keep it  dry, clean, and intact. She will maintain nonweightbearing on the  operative lower extremity. She has been given pain medications for home  use. She was also instructed on activity restrictions and follow up  with Dr. Zaina Torres for x-rays and wound check in his office.         Werner Aragon DO    D: 06/04/2019 15:26:03       T: 06/05/2019 1:18:53     [email or web address]  Job#: [id number]     Doc#: [id number]    CC:

## 2019-06-12 ENCOUNTER — OFFICE VISIT (OUTPATIENT)
Dept: ORTHOPEDIC SURGERY | Age: 29
End: 2019-06-12

## 2019-06-12 VITALS — HEART RATE: 127 BPM | DIASTOLIC BLOOD PRESSURE: 118 MMHG | SYSTOLIC BLOOD PRESSURE: 171 MMHG

## 2019-06-12 DIAGNOSIS — S82.841A BIMALLEOLAR ANKLE FRACTURE, RIGHT, CLOSED, INITIAL ENCOUNTER: ICD-10-CM

## 2019-06-12 DIAGNOSIS — Z98.890 HISTORY OF ANKLE SURGERY: ICD-10-CM

## 2019-06-12 DIAGNOSIS — S99.911A INJURY OF RIGHT ANKLE, INITIAL ENCOUNTER: ICD-10-CM

## 2019-06-12 DIAGNOSIS — Z98.890 HISTORY OF ANKLE SURGERY: Primary | ICD-10-CM

## 2019-06-12 PROCEDURE — 99024 POSTOP FOLLOW-UP VISIT: CPT | Performed by: ORTHOPAEDIC SURGERY

## 2019-06-12 RX ORDER — HYDROCODONE BITARTRATE AND ACETAMINOPHEN 5; 325 MG/1; MG/1
1 TABLET ORAL EVERY 6 HOURS PRN
COMMUNITY
End: 2019-07-10

## 2019-06-12 NOTE — PROGRESS NOTES
Post Operative Visit     Surgery: Right ankle open reduction and internal fixation  Date: 6/4/19    Subjective:    Garett Mendez is here for follow up visit s/p above procedure. She is doing well. She has been compliant with nonweightbearing. She has minimal pain    Controlled Substances Monitoring:        Physical Exam:    BP: (!) 171/118 (nervous for appt)    On exam, incisions are intact. There is expected level of swelling. There is intact sensation and motor function of the foot      Imaging: X-rays of the ankle including 3 views show good alignment status post ORIF of fibula and medial malleolus    Impression: Good alignment and stable hardware status post ankle ORIF    Assessment and Plan: She is 8 days out from right ankle ORIF  She was transition to a boot today. We did place Steri-Strips. She will remain nonweightbearing. She will not submerge her wounds. Follow-up in 4 weeks with repeat x-rays.     Darian Linares MD  Orthopaedic Surgery   6/12/19  9:58 AM

## 2019-07-10 ENCOUNTER — OFFICE VISIT (OUTPATIENT)
Dept: ORTHOPEDIC SURGERY | Age: 29
End: 2019-07-10

## 2019-07-10 VITALS — HEART RATE: 129 BPM | DIASTOLIC BLOOD PRESSURE: 97 MMHG | SYSTOLIC BLOOD PRESSURE: 147 MMHG

## 2019-07-10 DIAGNOSIS — Z98.890 HISTORY OF ANKLE SURGERY: Primary | ICD-10-CM

## 2019-07-10 DIAGNOSIS — S99.911A INJURY OF RIGHT ANKLE, INITIAL ENCOUNTER: ICD-10-CM

## 2019-07-10 DIAGNOSIS — Z98.890 HISTORY OF ANKLE SURGERY: ICD-10-CM

## 2019-07-10 DIAGNOSIS — S82.841A BIMALLEOLAR ANKLE FRACTURE, RIGHT, CLOSED, INITIAL ENCOUNTER: ICD-10-CM

## 2019-07-10 PROCEDURE — 99024 POSTOP FOLLOW-UP VISIT: CPT | Performed by: ORTHOPAEDIC SURGERY

## 2019-08-07 ENCOUNTER — OFFICE VISIT (OUTPATIENT)
Dept: ORTHOPEDIC SURGERY | Age: 29
End: 2019-08-07
Payer: COMMERCIAL

## 2019-08-07 VITALS — SYSTOLIC BLOOD PRESSURE: 132 MMHG | DIASTOLIC BLOOD PRESSURE: 91 MMHG | HEART RATE: 132 BPM | TEMPERATURE: 99.2 F

## 2019-08-07 DIAGNOSIS — Z98.890 HISTORY OF ANKLE SURGERY: ICD-10-CM

## 2019-08-07 DIAGNOSIS — S82.841A BIMALLEOLAR ANKLE FRACTURE, RIGHT, CLOSED, INITIAL ENCOUNTER: ICD-10-CM

## 2019-08-07 DIAGNOSIS — Z98.890 HISTORY OF ANKLE SURGERY: Primary | ICD-10-CM

## 2019-08-07 PROCEDURE — 99024 POSTOP FOLLOW-UP VISIT: CPT | Performed by: ORTHOPAEDIC SURGERY

## 2019-08-07 NOTE — PROGRESS NOTES
Post Operative Visit     Surgery: Right ankle open reduction and internal fixation  Date: 6/4/19    Subjective:    Sonia Burden is here for follow up visit s/p above procedure. She is doing well. She has been ambulating without assistance and with a normal shoe. She reports minimal pain    Controlled Substances Monitoring:        Physical Exam:    BP: (!) 132/91    On exam, incisions are healed. Range of motion is full. There is no tenderness around the ankle. There is no swelling. Imaging: X-rays of the right ankle were obtained today including 3 views. These show maintained alignment with interval healing of her fibula and medial malleolus fractures. Mortise remains anatomically aligned    Impression: Healed ankle fracture status post ORIF    Assessment and Plan: She is 2 months out from right ankle ORIF  She is doing well. She will continue to progress with activities as tolerated.   Follow-up in 2 months for final x-ray    Melodie Tineo MD  Orthopaedic Surgery   8/7/19  10:44 AM

## 2019-10-09 ENCOUNTER — OFFICE VISIT (OUTPATIENT)
Dept: ORTHOPEDIC SURGERY | Age: 29
End: 2019-10-09
Payer: COMMERCIAL

## 2019-10-09 VITALS
DIASTOLIC BLOOD PRESSURE: 89 MMHG | SYSTOLIC BLOOD PRESSURE: 142 MMHG | BODY MASS INDEX: 42.44 KG/M2 | HEART RATE: 116 BPM | HEIGHT: 68 IN | WEIGHT: 280 LBS

## 2019-10-09 DIAGNOSIS — Z98.890 HISTORY OF ANKLE SURGERY: ICD-10-CM

## 2019-10-09 DIAGNOSIS — Z98.890 HISTORY OF ANKLE SURGERY: Primary | ICD-10-CM

## 2019-10-09 PROCEDURE — 99213 OFFICE O/P EST LOW 20 MIN: CPT | Performed by: ORTHOPAEDIC SURGERY

## (undated) DEVICE — 3M™ STERI-DRAPE™ U-DRAPE 1015: Brand: STERI-DRAPE™

## (undated) DEVICE — SUTURE VIC +  4-0 27 IN PS1 UD VCP935H

## (undated) DEVICE — SPLINT ORTH W5XL30IN PLSTR OF PARIS FAST IMMOB OF FRAC HI

## (undated) DEVICE — STERILE HOOK LOCK LATEX FREE ELASTIC BANDAGE 4INX5YD: Brand: HOOK LOCK™

## (undated) DEVICE — ANCHOR FIX DISP FOR ANK FRAC SYS BB-TAK

## (undated) DEVICE — ZIMMER® STERILE DISPOSABLE TOURNIQUET CUFF WITH PLC, DUAL PORT, SINGLE BLADDER, 34 IN. (86 CM)

## (undated) DEVICE — 4-PORT MANIFOLD: Brand: NEPTUNE 2

## (undated) DEVICE — DRESSING,GAUZE,XEROFORM,CURAD,1"X8",ST: Brand: CURAD

## (undated) DEVICE — CHLORAPREP 26ML ORANGE

## (undated) DEVICE — BANDAGE COMPR W6INXL12FT SMOOTH FOR LIMB EXSANG ESMARCH

## (undated) DEVICE — PATIENT RETURN ELECTRODE, SINGLE-USE, CONTACT QUALITY MONITORING, ADULT, WITH 9FT CORD, FOR PATIENTS WEIGING OVER 33LBS. (15KG): Brand: MEGADYNE

## (undated) DEVICE — TUBING, SUCTION, 9/32" X 10', STRAIGHT: Brand: MEDLINE

## (undated) DEVICE — SPONGE LAP W18XL18IN WHT COT 4 PLY FLD STRUNG RADPQ DISP ST

## (undated) DEVICE — DRAPE,TOP,102X53,STERILE: Brand: MEDLINE

## (undated) DEVICE — BIT DRL DIA2MM CALIB FOR ANK FRAC MGMT SYS

## (undated) DEVICE — GAUZE,SPONGE,4"X4",8PLY,STRL,LF,10/TRAY: Brand: MEDLINE

## (undated) DEVICE — PADDING CAST W6INXL4YD COT LO LINTING WYTEX

## (undated) DEVICE — PACK PROCEDURE SURG GEN CUST

## (undated) DEVICE — DRAPE C ARM W41XL74IN UNIV MOB W RUBBERBAND CLP

## (undated) DEVICE — DRAPE,REIN 53X77,STERILE: Brand: MEDLINE

## (undated) DEVICE — BNDG,ELSTC,MATRIX,STRL,6"X5YD,LF,HOOK&LP: Brand: MEDLINE

## (undated) DEVICE — TOTAL KNEE PK

## (undated) DEVICE — PAD,ABDOMINAL,5"X9",ST,LF,25/BX: Brand: MEDLINE INDUSTRIES, INC.

## (undated) DEVICE — CLOTH SURG PREP PREOPERATIVE CHLORHEXIDINE GLUC 2% READYPREP

## (undated) DEVICE — TUBING SUCT 12FR MAL ALUM SHFT FN CAP VENT UNIV CONN W/ OBT

## (undated) DEVICE — Device

## (undated) DEVICE — DRAPE C ARM UNIV W41XL74IN CLR PLAS XR VELC CLSR POLY STRP

## (undated) DEVICE — CONTROL SYRINGE LUER-LOCK TIP: Brand: MONOJECT

## (undated) DEVICE — SOLUTION IV IRRIG WATER 1000ML POUR BRL 2F7114

## (undated) DEVICE — DOUBLE BASIN SET: Brand: MEDLINE INDUSTRIES, INC.

## (undated) DEVICE — PADDING,UNDERCAST,COTTON, 4"X4YD STERILE: Brand: MEDLINE

## (undated) DEVICE — TAPE ADH W3INXL10YD WHT COT WVN BK POWERFUL RUB BASE HIGHLY

## (undated) DEVICE — BIT DRL DIA3.5MM TRIM-IT

## (undated) DEVICE — BIT DRL DIA2.5MM FOR ANK FRAC MGMT SYS

## (undated) DEVICE — TOWEL,OR,DSP,ST,BLUE,STD,6/PK,12PK/CS: Brand: MEDLINE

## (undated) DEVICE — STANDARD HYPODERMIC NEEDLE,POLYPROPYLENE HUB: Brand: MONOJECT